# Patient Record
Sex: FEMALE | Race: WHITE
[De-identification: names, ages, dates, MRNs, and addresses within clinical notes are randomized per-mention and may not be internally consistent; named-entity substitution may affect disease eponyms.]

---

## 2019-08-23 ENCOUNTER — HOSPITAL ENCOUNTER (EMERGENCY)
Dept: HOSPITAL 75 - ER | Age: 49
Discharge: HOME | End: 2019-08-23
Payer: COMMERCIAL

## 2023-02-01 ENCOUNTER — HOSPITAL ENCOUNTER (OUTPATIENT)
Dept: HOSPITAL 75 - ORTHO | Age: 53
End: 2023-02-01
Attending: ORTHOPAEDIC SURGERY
Payer: COMMERCIAL

## 2023-02-01 DIAGNOSIS — D16.11: Primary | ICD-10-CM

## 2023-02-01 PROCEDURE — 73130 X-RAY EXAM OF HAND: CPT

## 2023-02-01 PROCEDURE — 99203 OFFICE O/P NEW LOW 30 MIN: CPT

## 2023-02-01 NOTE — DIAGNOSTIC IMAGING REPORT
HAND, 3 VIEWS, BILATERAL



INDICATION: Bilateral hand pain



COMPARISON: None available.



TECHNIQUE: 3 views of each hand for a total of 6 views.



FINDINGS: There is an expansile lucent lesion in the distal

aspect of the right 5th metacarpal that is most compatible with

enchondroma. There is marked thinning along the radial cortex and

there could be a pathologic fracture present.



The remainder of the osseous structures of both hands are normal

in appearance. No osseous erosions. No cartilage calcifications.

No hypertrophic degenerative change.



IMPRESSION: 

1. Enchondroma in the right 5th metacarpal may have a

nondisplaced pathologic fracture. Correlation for focal pain in

this region is advised.

2. Otherwise, no osseous abnormality within either hand.



Dictated by: 



  Dictated on workstation # [id number]

## 2023-04-25 ENCOUNTER — HOSPITAL ENCOUNTER (OUTPATIENT)
Dept: HOSPITAL 75 - ORTHO | Age: 53
End: 2023-04-25
Attending: ORTHOPAEDIC SURGERY
Payer: COMMERCIAL

## 2023-04-25 DIAGNOSIS — G56.03: Primary | ICD-10-CM

## 2023-04-25 PROCEDURE — 99213 OFFICE O/P EST LOW 20 MIN: CPT

## 2023-05-12 ENCOUNTER — HOSPITAL ENCOUNTER (OUTPATIENT)
Dept: HOSPITAL 75 - PREOP | Age: 53
LOS: 4 days | Discharge: HOME | End: 2023-05-16
Attending: ORTHOPAEDIC SURGERY
Payer: COMMERCIAL

## 2023-05-12 VITALS — WEIGHT: 250.22 LBS | BODY MASS INDEX: 41.69 KG/M2 | HEIGHT: 65 IN

## 2023-05-12 DIAGNOSIS — Z01.818: Primary | ICD-10-CM

## 2023-05-19 ENCOUNTER — HOSPITAL ENCOUNTER (OUTPATIENT)
Dept: HOSPITAL 75 - SDC | Age: 53
Discharge: HOME | End: 2023-05-19
Attending: ORTHOPAEDIC SURGERY
Payer: COMMERCIAL

## 2023-05-19 VITALS — DIASTOLIC BLOOD PRESSURE: 100 MMHG | SYSTOLIC BLOOD PRESSURE: 140 MMHG

## 2023-05-19 VITALS — DIASTOLIC BLOOD PRESSURE: 57 MMHG | SYSTOLIC BLOOD PRESSURE: 111 MMHG

## 2023-05-19 VITALS — DIASTOLIC BLOOD PRESSURE: 95 MMHG | SYSTOLIC BLOOD PRESSURE: 157 MMHG

## 2023-05-19 VITALS — BODY MASS INDEX: 41.69 KG/M2 | HEIGHT: 64.96 IN | WEIGHT: 250.22 LBS

## 2023-05-19 VITALS — SYSTOLIC BLOOD PRESSURE: 157 MMHG | DIASTOLIC BLOOD PRESSURE: 74 MMHG

## 2023-05-19 VITALS — DIASTOLIC BLOOD PRESSURE: 93 MMHG | SYSTOLIC BLOOD PRESSURE: 130 MMHG

## 2023-05-19 VITALS — SYSTOLIC BLOOD PRESSURE: 98 MMHG | DIASTOLIC BLOOD PRESSURE: 54 MMHG

## 2023-05-19 VITALS — SYSTOLIC BLOOD PRESSURE: 100 MMHG | DIASTOLIC BLOOD PRESSURE: 52 MMHG

## 2023-05-19 DIAGNOSIS — E66.01: ICD-10-CM

## 2023-05-19 DIAGNOSIS — G56.02: Primary | ICD-10-CM

## 2023-05-19 PROCEDURE — 87081 CULTURE SCREEN ONLY: CPT

## 2023-05-19 NOTE — OPERATIVE REPORT - ORTHO
Operative Report


Surgeon (s)/Assistant (s)


Surgeon


LAMIN CHI MD


Assistant


n/a





Pre-Operative Diagnosis


Left Carpal Tunnel Syndrome





Post-Operative Diagnosis


same





Operative Report


Date of Procedure:  May 19, 2023


Name of Procedure Performed:  


Left Carpal Tunnel Release


Description & Findings


After obtaining informed consent and marking the patient in the preoperative 

holding area, the patient was administered IV antibiotics.  The patient was t

aken to the operating room and sedation was induced.  Local anesthetic was 

placed.  The left upper extremity was prepped and draped in the usual sterile 

fashion.  Surgical timeout was taken.  Incision was made just ulnar to the 

thenar crease.  Blunt dissection was carried down to the longitudinal fibers of 

the palmar fascia; these were divided in line revealing the transverse carpal 

ligament.  Beginning distally and working proximally, carpal tunnel release was 

performed.  Nerve protector was placed and release was completed back to the 

level of the forearm fascia.  Probe was inserted and release was palpably 

complete.  Tourniquet was dropped and hemostasis was achieved.  Wound was closed

with 4-0 nylon.  Wound was dressed with xeroform, 4x4s, madhu, ABD for soft 

splint, cast padding, and ACE wrap.  Patient tolerated the proceudre well and 

was stable to the recovery room.


Anesthesia Type


MAC plus Local


Estimated Blood Loss


minimal


Specimen(s) collected/removed


None











LAMIN CHI MD              May 19, 2023 08:47

## 2023-05-19 NOTE — ANESTHESIA-GENERAL POST-OP
MAC


Patient Condition


Mental Status/LOC:  Same as Preop


Cardiovascular:  Satisfactory


Nausea/Vomiting:  Absent


Respiratory:  Satisfactory


Pain:  Controlled


Complications:  Absent





Post Op Complications


Complications


None





Follow Up Care/Instructions


Patient Instructions


None needed.





Anesthesiology Discharge Order


Discharge Order


Patient is doing well, no complaints, stable vital signs, no apparent adverse 

anesthesia problems.   


No complications reported per nursing.











LOLI GLYNN CRNA         May 19, 2023 09:54

## 2023-05-19 NOTE — PROGRESS NOTE-PRE OPERATIVE
Pre-Operative Progress Note


Date of Available H&P:  Apr 25, 2023


Date H&P Reviewed:  May 19, 2023


Time H&P Reviewed:  07:45


History & Physical:  H&P Reviewed, Patient Examed, No changes noted


Pre-Operative Diagnosis:  Left Carpal Tunnel Syndrome











LAMIN CHI MD              May 19, 2023 07:54

## 2023-06-01 ENCOUNTER — HOSPITAL ENCOUNTER (OUTPATIENT)
Dept: HOSPITAL 75 - ORTHO | Age: 53
End: 2023-06-01
Attending: ORTHOPAEDIC SURGERY
Payer: COMMERCIAL

## 2023-06-01 DIAGNOSIS — Z47.89: Primary | ICD-10-CM

## 2023-07-05 ENCOUNTER — HOSPITAL ENCOUNTER (OUTPATIENT)
Dept: HOSPITAL 75 - ORTHO | Age: 53
End: 2023-07-05
Attending: ORTHOPAEDIC SURGERY
Payer: COMMERCIAL

## 2023-07-05 DIAGNOSIS — Z47.89: Primary | ICD-10-CM
